# Patient Record
Sex: FEMALE | Race: WHITE | NOT HISPANIC OR LATINO | Employment: OTHER | URBAN - METROPOLITAN AREA
[De-identification: names, ages, dates, MRNs, and addresses within clinical notes are randomized per-mention and may not be internally consistent; named-entity substitution may affect disease eponyms.]

---

## 2017-04-25 ENCOUNTER — HOSPITAL ENCOUNTER (OUTPATIENT)
Dept: RADIOLOGY | Facility: HOSPITAL | Age: 77
Discharge: HOME | End: 2017-04-25
Attending: NURSE PRACTITIONER
Payer: MEDICARE

## 2017-04-25 DIAGNOSIS — N63.0 LUMP OR MASS IN BREAST: ICD-10-CM

## 2017-04-25 PROCEDURE — 77061 BREAST TOMOSYNTHESIS UNI: CPT | Mod: 26,LT,, | Performed by: RADIOLOGY

## 2017-04-25 PROCEDURE — 76642 ULTRASOUND BREAST LIMITED: CPT | Mod: 26,LT,, | Performed by: RADIOLOGY

## 2017-04-25 PROCEDURE — 76642 ULTRASOUND BREAST LIMITED: CPT | Mod: TC,PO,LT

## 2017-04-25 PROCEDURE — 77061 BREAST TOMOSYNTHESIS UNI: CPT | Mod: TC,LT

## 2017-04-25 PROCEDURE — 77065 DX MAMMO INCL CAD UNI: CPT | Mod: 26,LT,, | Performed by: RADIOLOGY

## 2017-04-28 ENCOUNTER — DOCUMENTATION ONLY (OUTPATIENT)
Dept: RADIOLOGY | Facility: HOSPITAL | Age: 77
End: 2017-04-28

## 2017-04-28 NOTE — PROGRESS NOTES
Breast Care Management Follow-Up:    Date of Mammogram:04/25/17    Mammogram Reason:Left breast mass and pain    Mammogram Results:and Left U/S - 3 complex cysts vs solid masses. Cat 4.      Referrals/Recommendations:Surgery consult        Patient Status:04/28/17 Results letter and report mailed to pt. Report faxed and mailed to Suzy Brooks NP with note to contact me if I could assist in scheduling a surgery consult. Also, to please let me know if pt goes elsewhere so I can document in her chart.

## 2019-04-30 PROBLEM — I10 ESSENTIAL HYPERTENSION: Status: ACTIVE | Noted: 2019-04-30

## 2019-04-30 PROBLEM — R04.0 EPISTAXIS: Status: ACTIVE | Noted: 2019-04-30

## 2019-04-30 PROBLEM — J34.89 NASAL OBSTRUCTION: Status: ACTIVE | Noted: 2019-04-30

## 2019-04-30 PROBLEM — J34.2 DEVIATED NASAL SEPTUM: Status: ACTIVE | Noted: 2019-04-30

## 2019-04-30 PROBLEM — I48.91 ATRIAL FIBRILLATION: Status: ACTIVE | Noted: 2019-04-30

## 2019-04-30 PROBLEM — E07.9 THYROID DISORDER: Status: ACTIVE | Noted: 2019-04-30

## 2019-04-30 PROBLEM — J34.3 NASAL TURBINATE HYPERTROPHY: Status: ACTIVE | Noted: 2019-04-30
